# Patient Record
Sex: MALE | URBAN - METROPOLITAN AREA
[De-identification: names, ages, dates, MRNs, and addresses within clinical notes are randomized per-mention and may not be internally consistent; named-entity substitution may affect disease eponyms.]

---

## 2024-09-02 ENCOUNTER — HOSPITAL ENCOUNTER (EMERGENCY)
Facility: HOSPITAL | Age: 34
Discharge: HOME | End: 2024-09-02
Payer: COMMERCIAL

## 2024-09-02 VITALS
BODY MASS INDEX: 18.19 KG/M2 | DIASTOLIC BLOOD PRESSURE: 91 MMHG | SYSTOLIC BLOOD PRESSURE: 129 MMHG | RESPIRATION RATE: 15 BRPM | TEMPERATURE: 96.8 F | WEIGHT: 120 LBS | HEART RATE: 77 BPM | OXYGEN SATURATION: 100 % | HEIGHT: 68 IN

## 2024-09-02 DIAGNOSIS — S61.011A LACERATION OF RIGHT THUMB WITHOUT FOREIGN BODY WITHOUT DAMAGE TO NAIL, INITIAL ENCOUNTER: Primary | ICD-10-CM

## 2024-09-02 DIAGNOSIS — M79.644 PAIN OF RIGHT THUMB: ICD-10-CM

## 2024-09-02 PROCEDURE — 12001 RPR S/N/AX/GEN/TRNK 2.5CM/<: CPT

## 2024-09-02 PROCEDURE — 99283 EMERGENCY DEPT VISIT LOW MDM: CPT

## 2024-09-02 ASSESSMENT — PAIN DESCRIPTION - PAIN TYPE: TYPE: ACUTE PAIN

## 2024-09-02 ASSESSMENT — PAIN DESCRIPTION - LOCATION: LOCATION: FINGER (COMMENT WHICH ONE)

## 2024-09-02 ASSESSMENT — COLUMBIA-SUICIDE SEVERITY RATING SCALE - C-SSRS
6. HAVE YOU EVER DONE ANYTHING, STARTED TO DO ANYTHING, OR PREPARED TO DO ANYTHING TO END YOUR LIFE?: NO
2. HAVE YOU ACTUALLY HAD ANY THOUGHTS OF KILLING YOURSELF?: NO
1. IN THE PAST MONTH, HAVE YOU WISHED YOU WERE DEAD OR WISHED YOU COULD GO TO SLEEP AND NOT WAKE UP?: NO

## 2024-09-02 ASSESSMENT — PAIN - FUNCTIONAL ASSESSMENT: PAIN_FUNCTIONAL_ASSESSMENT: 0-10

## 2024-09-02 ASSESSMENT — PAIN DESCRIPTION - ORIENTATION: ORIENTATION: RIGHT

## 2024-09-02 ASSESSMENT — PAIN SCALES - GENERAL: PAINLEVEL_OUTOF10: 2

## 2024-09-03 NOTE — ED TRIAGE NOTES
Pt cut his right thumb on plastic water fountain. Pt unsure if he's up to date on tetnus. Bleeding controlled.

## 2024-09-03 NOTE — DISCHARGE INSTRUCTIONS
Keep wound clean dry and covered for 24 hours.  After 24 hours may wash with gentle soap and pat dry.  Do not submerge wound in water until sutures are removed in 10 to 14 days.  Apply topical antibiotic ointment twice per day using Q-tip.  This will aid in wound healing and prevent infection.  May take Tylenol and/or ibuprofen as needed for pain or headache.  Follow-up with PCP for suture removal.  Return to nearest ER for any new or worsening symptoms including fever, purulent drainage from the wound or anything else concerning to you.

## 2024-09-03 NOTE — ED PROVIDER NOTES
Chief Complaint   Patient presents with    Finger Laceration     HPI:   Pravin Tovar is an 33 y.o. male with no significant past medical history presenting to the ED for a right thumb laceration.  Patient explains that he was cleaning his cat's water bowl and is unsure if he sliced his finger on the plastic or other titanium of it.  His last tetanus was in 2022.  He denies numbness or tingling.  He is not diabetic.  No gross contamination.  Was not wearing gloves.    Allergies   Allergen Reactions    Penicillins Unknown   :  No past medical history on file.  No past surgical history on file.  No family history on file.     Physical Exam  Vitals and nursing note reviewed.   Constitutional:       General: He is not in acute distress.     Appearance: He is well-developed.   HENT:      Head: Normocephalic and atraumatic.      Nose: Nose normal.      Mouth/Throat:      Mouth: Mucous membranes are moist.      Pharynx: Oropharynx is clear.   Eyes:      Conjunctiva/sclera: Conjunctivae normal.   Cardiovascular:      Rate and Rhythm: Normal rate and regular rhythm.      Heart sounds: No murmur heard.  Pulmonary:      Effort: Pulmonary effort is normal. No respiratory distress.      Breath sounds: Normal breath sounds.   Abdominal:      Palpations: Abdomen is soft.      Tenderness: There is no abdominal tenderness.   Musculoskeletal:         General: Signs of injury present.      Cervical back: Neck supple.      Comments: Patient is actively able to flex and AB duct the thumb without pain   Skin:     General: Skin is warm and dry.      Capillary Refill: Capillary refill takes less than 2 seconds.      Comments: 1.5 linear laceration to the palmar aspect of the right thumb just distal to the DIP.   Neurological:      General: No focal deficit present.      Mental Status: He is alert and oriented to person, place, and time.      Sensory: No sensory deficit.   Psychiatric:         Mood and Affect: Mood normal.        VS: As  documented in the triage note and EMR flowsheet from this visit were reviewed.      Medical Decision Making: This is a 33-year-old male with no significant past medical history presenting for a laceration to his right thumb.  He was cleaning his cats water-when the laceration happened.  He was not wearing gloves.  He is right-handed.  His vitals are stable.  The wound is linear over the palmar aspect of the right thumb.  Hemostasis was achieved with direct pressure.  I did explore the wound in depth and did not visualize any foreign bodies.  It was aggressively cleansed with Hibiclens solution.  It was well-approximated with 5 Prolene sutures.  I do not think antibiotics are warranted at this time.  He is appropriate for outpatient management.  He will follow-up with his PCP for suture removal in 10 to 14 days.  Considered x-rays however the wound was thoroughly explored did not visualize any foreign body and tendon function was preserved.    Counseling: Spoke with the patient and discussed today´s findings, in addition to providing specific details for the plan of care and expected course.  Patient was given the opportunity to ask questions.    Discussed return precautions and importance of follow-up.  Advised to follow-up with PCP.  I specifically advised to return to the ED for changing or worsening symptoms, new symptoms, complaint specific precautions, and precautions listed on the discharge paperwork.  Educated on the common potential side effects of medications prescribed.    I advised the patient that the emergency evaluation and treatment provided today doesn't end their need for medical care. It is very important that they follow-up with their primary care provider or other specialist as instructed.    The plan of care was mutually agreed upon with the patient. The patient and/or family were given the opportunity to ask questions. All questions asked today in the ED were answered to the best of my ability  with today's information.    This patient was cared for in the setting of nationwide stress on resources and staffing.    This report was transcribed using voice recognition software.  Every effort was made to ensure accuracy, however, inadvertently computerized transcription errors may be present.       Rehan Reyes PA-C  09/02/24 [id number]